# Patient Record
Sex: FEMALE | Race: WHITE | Employment: FULL TIME | URBAN - METROPOLITAN AREA
[De-identification: names, ages, dates, MRNs, and addresses within clinical notes are randomized per-mention and may not be internally consistent; named-entity substitution may affect disease eponyms.]

---

## 2017-12-14 ENCOUNTER — HOSPITAL ENCOUNTER (OUTPATIENT)
Dept: DIABETES SERVICES | Age: 27
Discharge: HOME | End: 2017-12-31
Attending: OBSTETRICS & GYNECOLOGY | Admitting: OBSTETRICS & GYNECOLOGY

## 2017-12-14 VITALS — WEIGHT: 293 LBS | HEIGHT: 70 IN | BODY MASS INDEX: 41.95 KG/M2

## 2017-12-14 RX ORDER — LABETALOL 100 MG/1
50 TABLET, FILM COATED ORAL 2 TIMES DAILY
Status: ON HOLD | COMMUNITY
End: 2018-02-10 | Stop reason: HOSPADM

## 2017-12-14 ASSESSMENT — PATIENT HEALTH QUESTIONNAIRE - PHQ9
SUM OF ALL RESPONSES TO PHQ QUESTIONS 1-9: 0
SUM OF ALL RESPONSES TO PHQ9 QUESTIONS 1 & 2: 0
1. LITTLE INTEREST OR PLEASURE IN DOING THINGS: 0
2. FEELING DOWN, DEPRESSED OR HOPELESS: 0

## 2017-12-14 NOTE — PROGRESS NOTES
Demonstration  Education Materials/Equipment Provided:    [x]GDM Meal Plan []Glucose Meter  []Insulin Kit  [x] GDM Nutritional Packet    [x] Gestational Diabetes and Pregnancy    Encounter Type Date Start Time End Time Comments No Show Dates   Assessment and Education 12- [phone]   []In Person  []Telephone    Meter Instrx 12-         Additional Comments:Receptive to information. Plans to start monitoring today    DSMS Support Plan: access Diabetes Website   Follow-up plan/Date:   Contact Post Class Regarding:   Blood Sugar              Follow-up with Physician   Monitoring Frequency   Goal Attainment  Post Education Referrals:       []WIC   []PAP   []Wound Care   []Social Service   []Home Health  []Support Group   []Physician Specialist   []Rehabilitation   [x]Other/Contact numbers provided  Louann Cuellar.  Brain Sheets RN, BSN, CDE

## 2017-12-14 NOTE — LETTER
[address]  Diabetes Education    2017     Re:     Carmen Bird  :  1990    Dear Dr. Harlene Habermann: Your patient, Carmen Bird, was seen for Gestational Diabetes Education today. The following topics were addressed:      Definition of Gestational Diabetes     Carbohydrate Counting    Risk Factors/Acute Complications    Timing of Meals    Blood Glucose Monitoring        Exercise/Activity       Eating Out                          Did well with return demonstration on    Own [employer]   glucose meter. Blood glucose was  90     . COMMENTS:  Plans to start monitoring BG and using meal plan. GOAL    follow individual meal plan    monitor blood glucose     log glucose results and bring to physicians office at each scheduled appointment          Thank you for referring this patient to our program.  Please do not hesitate to call if you have any questions. Sincerely,    Vesumit Paige.  Keenan Carbajal RN, BSN, CDE

## 2018-01-01 ENCOUNTER — HOSPITAL ENCOUNTER (OUTPATIENT)
Dept: OTHER | Age: 28
Discharge: HOME | End: 2018-01-31
Attending: OBSTETRICS & GYNECOLOGY | Admitting: OBSTETRICS & GYNECOLOGY

## 2018-02-05 PROBLEM — Z32.02 PREGNANCY EXAMINATION OR TEST, NEGATIVE RESULT: Status: ACTIVE | Noted: 2018-02-05

## 2018-02-06 PROBLEM — O36.63X1 LARGE FOR GESTATIONAL AGE FETUS AFFECTING MANAGEMENT OF MOTHER, ANTEPARTUM, THIRD TRIMESTER, FETUS 1: Status: ACTIVE | Noted: 2018-02-06

## 2018-02-06 PROBLEM — O13.1: Status: ACTIVE | Noted: 2018-02-06

## 2018-02-06 PROBLEM — O24.410 DIET CONTROLLED GESTATIONAL DIABETES MELLITUS (GDM): Status: ACTIVE | Noted: 2018-02-06

## 2018-02-10 PROBLEM — Z98.891 STATUS POST CESAREAN DELIVERY: Status: ACTIVE | Noted: 2018-02-10

## 2018-02-10 PROBLEM — Z32.02 PREGNANCY EXAMINATION OR TEST, NEGATIVE RESULT: Status: RESOLVED | Noted: 2018-02-05 | Resolved: 2018-02-10

## 2018-10-12 ENCOUNTER — HOSPITAL ENCOUNTER (OUTPATIENT)
Dept: GENERAL RADIOLOGY | Age: 28
Discharge: HOME | End: 2018-10-12
Payer: COMMERCIAL

## 2018-10-12 ENCOUNTER — HOSPITAL ENCOUNTER (OUTPATIENT)
Age: 28
Discharge: HOME | End: 2018-10-12
Payer: COMMERCIAL

## 2018-10-12 DIAGNOSIS — M25.551 RIGHT HIP PAIN: ICD-10-CM

## 2018-10-12 DIAGNOSIS — M54.5 RIGHT LOW BACK PAIN, UNSPECIFIED CHRONICITY, WITH SCIATICA PRESENCE UNSPECIFIED: ICD-10-CM

## 2018-10-12 PROCEDURE — 72110 X-RAY EXAM L-2 SPINE 4/>VWS: CPT

## 2018-10-12 PROCEDURE — 73501 X-RAY EXAM HIP UNI 1 VIEW: CPT
